# Patient Record
Sex: FEMALE | Race: BLACK OR AFRICAN AMERICAN | NOT HISPANIC OR LATINO | Employment: OTHER | URBAN - METROPOLITAN AREA
[De-identification: names, ages, dates, MRNs, and addresses within clinical notes are randomized per-mention and may not be internally consistent; named-entity substitution may affect disease eponyms.]

---

## 2019-12-09 PROBLEM — D64.9 ANEMIA, NORMOCYTIC NORMOCHROMIC: Status: ACTIVE | Noted: 2019-12-09

## 2019-12-09 PROBLEM — I87.2 STASIS DERMATITIS OF BOTH LEGS: Status: ACTIVE | Noted: 2017-04-08

## 2019-12-09 PROBLEM — E66.3 OVERWEIGHT WITH BODY MASS INDEX (BMI) 25.0-29.9: Status: ACTIVE | Noted: 2019-12-09

## 2019-12-09 PROBLEM — E78.5 DYSLIPIDEMIA: Status: ACTIVE | Noted: 2019-12-09

## 2020-02-14 PROBLEM — R42 DIZZINESS: Status: ACTIVE | Noted: 2020-02-14

## 2020-02-18 PROBLEM — L60.2 NAIL OVERGROWTH: Status: ACTIVE | Noted: 2020-02-18

## 2020-02-18 PROBLEM — L60.0 IGTN (INGROWING TOE NAIL): Status: ACTIVE | Noted: 2020-02-18

## 2020-04-08 ENCOUNTER — EXTERNAL HOME HEALTH (OUTPATIENT)
Dept: HOME HEALTH SERVICES | Facility: HOSPITAL | Age: 80
End: 2020-04-08

## 2020-04-13 ENCOUNTER — EXTERNAL HOME HEALTH (OUTPATIENT)
Dept: HOME HEALTH SERVICES | Facility: HOSPITAL | Age: 80
End: 2020-04-13

## 2020-06-27 PROBLEM — N17.9 AKI (ACUTE KIDNEY INJURY): Status: ACTIVE | Noted: 2020-06-27

## 2020-06-30 PROBLEM — I63.9 CVA (CEREBRAL VASCULAR ACCIDENT): Status: ACTIVE | Noted: 2020-06-30

## 2021-05-19 PROBLEM — M1A.0720 IDIOPATHIC CHRONIC GOUT OF LEFT FOOT WITHOUT TOPHUS: Status: ACTIVE | Noted: 2021-05-19

## 2021-11-01 PROBLEM — M15.9 PRIMARY OSTEOARTHRITIS INVOLVING MULTIPLE JOINTS: Status: ACTIVE | Noted: 2021-11-01

## 2021-11-01 PROBLEM — M15.0 PRIMARY OSTEOARTHRITIS INVOLVING MULTIPLE JOINTS: Status: ACTIVE | Noted: 2021-11-01

## 2022-01-26 PROBLEM — R00.2 PALPITATIONS: Status: ACTIVE | Noted: 2022-01-26

## 2022-04-26 ENCOUNTER — EXTERNAL HOME HEALTH (OUTPATIENT)
Dept: HOME HEALTH SERVICES | Facility: HOSPITAL | Age: 82
End: 2022-04-26
Payer: MEDICARE

## 2022-05-23 PROBLEM — E11.51 TYPE 2 DIABETES MELLITUS WITH DIABETIC PERIPHERAL ANGIOPATHY WITHOUT GANGRENE: Status: ACTIVE | Noted: 2017-07-14

## 2022-05-23 PROBLEM — Z95.818 PRESENCE OF OTHER CARDIAC IMPLANTS AND GRAFTS: Status: ACTIVE | Noted: 2021-09-27

## 2022-08-27 DIAGNOSIS — U07.1 COVID-19 VIRUS DETECTED: ICD-10-CM

## 2022-09-05 ENCOUNTER — NURSE TRIAGE (OUTPATIENT)
Dept: ADMINISTRATIVE | Facility: CLINIC | Age: 82
End: 2022-09-05
Payer: MEDICARE

## 2022-09-05 NOTE — TELEPHONE ENCOUNTER
Duplicate  Reason for Disposition   Caller has already spoken with another triager and has no further questions.    Protocols used: No Contact or Duplicate Contact Call-A-AH

## 2022-09-05 NOTE — TELEPHONE ENCOUNTER
Pt called for c/o weakness, cough, and voice keeps cracking pt was dx with covid 10 day lyle and she said that she isnt getting better she has the lingering symtpoms. Pt denies SOB except when she is coughing continuous but said its more the weakness that she is experiencing. Pt has appt tomorrow to see MD and has a ride pt was triaged and ED advised per protocol for care advice and she said that she didn't have a ride. Pt then told to call 911 and she is refusing. Pt said that she will call back if she gets any worse             Reason for Disposition   Patient sounds very sick or weak to the triager    Additional Information   Negative: SEVERE difficulty breathing (e.g., struggling for each breath, speaks in single words)   Negative: [1] SEVERE weakness (e.g., can't stand or can barely walk) AND [2] new-onset or WORSE   Negative: Difficult to awaken or acting confused (e.g., disoriented, slurred speech)   Negative: Bluish (or gray) lips or face now   Negative: Sounds like a life-threatening emergency to the triager   Negative: [1] MODERATE difficulty breathing (e.g., speaks in phrases, SOB even at rest, pulse 100-120) AND [2] new-onset or WORSE   Negative: [1] MODERATE difficulty breathing AND [2] oxygen level (e.g., pulse oximetry) 91 to 94 percent   Negative: Oxygen level (e.g., pulse oximetry) 90 percent or lower   Negative: [1] Drinking very little AND [2] dehydration suspected (e.g., no urine > 12 hours, very dry mouth, very lightheaded)   Negative: MODERATE difficulty breathing (e.g., speaks in phrases, SOB even at rest, pulse 100-120)    Protocols used: Coronavirus (COVID-19) Persisting Symptoms Follow-up Call-A-

## 2022-09-06 NOTE — TELEPHONE ENCOUNTER
Phone call to patient.  We discussed her persistent COVID symptoms (mostly cough and hoarseness, both of which are getting better).  I see no reason for her to be seen urgently.

## 2022-11-21 PROBLEM — R68.89 ABNORMAL ANKLE BRACHIAL INDEX (ABI): Status: ACTIVE | Noted: 2022-11-21

## 2023-06-07 PROBLEM — R06.02 SHORTNESS OF BREATH ON EXERTION: Status: ACTIVE | Noted: 2023-06-07

## 2023-06-14 PROBLEM — M21.40 PES PLANUS: Status: ACTIVE | Noted: 2023-04-12

## 2023-06-14 PROBLEM — M77.30 CALCANEAL SPUR: Status: ACTIVE | Noted: 2023-04-12

## 2024-02-07 PROBLEM — M79.10 MYALGIA: Status: ACTIVE | Noted: 2024-02-07

## 2024-02-07 PROBLEM — M79.89 LEG SWELLING: Status: ACTIVE | Noted: 2024-02-07

## 2024-02-07 PROBLEM — I38 VALVULAR HEART DISEASE: Status: ACTIVE | Noted: 2024-02-07

## 2024-02-08 PROBLEM — R26.9 GAIT ABNORMALITY: Status: ACTIVE | Noted: 2024-02-08

## 2024-02-08 PROBLEM — E11.9 TYPE 2 DIABETES MELLITUS WITHOUT COMPLICATION, WITHOUT LONG-TERM CURRENT USE OF INSULIN: Status: ACTIVE | Noted: 2024-02-08

## 2024-02-08 PROBLEM — I69.30 HISTORY OF STROKE WITH RESIDUAL EFFECTS: Status: ACTIVE | Noted: 2020-06-30

## 2024-02-12 PROBLEM — N30.00 ACUTE CYSTITIS WITH POSITIVE CULTURE: Status: ACTIVE | Noted: 2024-02-12

## 2024-02-13 PROBLEM — M79.89 LEG SWELLING: Status: RESOLVED | Noted: 2024-02-07 | Resolved: 2024-02-13

## 2024-02-28 PROBLEM — Z91.81 AT RISK FOR FALLS: Status: ACTIVE | Noted: 2023-08-18

## 2024-02-28 PROBLEM — N17.9 AKI (ACUTE KIDNEY INJURY): Status: RESOLVED | Noted: 2020-06-27 | Resolved: 2024-02-28

## 2024-05-02 ENCOUNTER — PATIENT OUTREACH (OUTPATIENT)
Dept: ADMINISTRATIVE | Facility: OTHER | Age: 84
End: 2024-05-02

## 2024-05-02 NOTE — PROGRESS NOTES
CHW - Outreach Attempt    Community Health Worker left a voicemail message for 1st attempt to contact patient regarding: community resources    Community Health Worker to attempt to contact patient on: 5/2/24

## 2024-05-06 NOTE — PROGRESS NOTES
CHW - Outreach Attempt    Community Health Worker left a voicemail message for 2nd attempt to contact patient regarding:  community resources      Community Health Worker to attempt to contact patient on: 5/6/24      Claudia Asif  Office: [phone]  Community Health Worker

## 2024-05-07 NOTE — PROGRESS NOTES
CHW - Initial Contact    This Community Health Worker updated the Social Determinant of Health questionnaire with patient via telephone today.      Pt identified barriers of most importance are: transportation: pt stated she used [employer] but they only provides rides in the AM (8 am- 12 pm)    Pt also stated she doesn't have [employer] transportation    The only other resource is [employer] application (since pt uses a walker) pt stated she will ask her niece to fill out the application      Support and Services: CHW mailed the [employer] transit application      Follow up required: yes    Follow-up Outreach - Due: 5/14/2024

## 2024-05-13 PROBLEM — N30.00 ACUTE CYSTITIS WITH POSITIVE CULTURE: Status: RESOLVED | Noted: 2024-02-12 | Resolved: 2024-05-13

## 2024-05-15 ENCOUNTER — PATIENT OUTREACH (OUTPATIENT)
Dept: ADMINISTRATIVE | Facility: OTHER | Age: 84
End: 2024-05-15

## 2024-05-15 NOTE — PROGRESS NOTES
CHW - Outreach Attempt    Community Health Worker left a voicemail message for 1st attempt to contact patient regarding: if pt received the [employer] application    Community Health Worker to attempt to contact patient on: 5/15/24

## 2024-05-16 ENCOUNTER — PATIENT OUTREACH (OUTPATIENT)
Dept: ADMINISTRATIVE | Facility: OTHER | Age: 84
End: 2024-05-16

## 2024-05-16 NOTE — PROGRESS NOTES
CHW - Follow Up    This Community Health Worker completed a follow up visit with patient via telephone today.    Pt/Caregiver reported: pt confirmed she received the [employer] application chw mailed    Community Health Worker provided: CHW sent in basket to pcp's staff about Rx request      Follow up required: yes      Follow-up Outreach - Due: 5/20/2024

## 2024-05-16 NOTE — PROGRESS NOTES
CHW - Outreach Attempt    Community Health Worker left a voicemail message for 1st attempt to contact patient regarding: [employer] application    Community Health Worker to attempt to contact patient on: 5/16/24

## 2024-05-21 ENCOUNTER — PATIENT OUTREACH (OUTPATIENT)
Dept: ADMINISTRATIVE | Facility: OTHER | Age: 84
End: 2024-05-21

## 2024-05-28 ENCOUNTER — PATIENT OUTREACH (OUTPATIENT)
Dept: ADMINISTRATIVE | Facility: OTHER | Age: 84
End: 2024-05-28

## 2024-05-28 NOTE — PROGRESS NOTES
CHW - Outreach Attempt    Community Health Worker left a voicemail message for 1st attempt to contact patient regarding: community resources    Community Health Worker to attempt to contact patient on: 5/28/24

## 2024-06-04 ENCOUNTER — PATIENT OUTREACH (OUTPATIENT)
Dept: ADMINISTRATIVE | Facility: OTHER | Age: 84
End: 2024-06-04

## 2024-06-04 NOTE — PROGRESS NOTES
CHW - Outreach Attempt    Community Health Worker left a voicemail message for 2nd attempt to contact patient regarding: community resources    Community Health Worker to attempt to contact patient on: 6/4/24

## 2024-06-06 ENCOUNTER — PATIENT OUTREACH (OUTPATIENT)
Dept: ADMINISTRATIVE | Facility: OTHER | Age: 84
End: 2024-06-06

## 2024-08-13 PROBLEM — Z79.4 TYPE 2 DIABETES MELLITUS WITH DIABETIC PERIPHERAL ANGIOPATHY WITHOUT GANGRENE, WITH LONG-TERM CURRENT USE OF INSULIN: Status: ACTIVE | Noted: 2017-07-14

## 2024-08-13 PROBLEM — Z79.4 TYPE 2 DIABETES MELLITUS WITHOUT COMPLICATION, WITH LONG-TERM CURRENT USE OF INSULIN: Status: ACTIVE | Noted: 2024-02-08

## 2024-08-23 ENCOUNTER — PATIENT OUTREACH (OUTPATIENT)
Dept: ADMINISTRATIVE | Facility: OTHER | Age: 84
End: 2024-08-23

## 2024-08-23 NOTE — PROGRESS NOTES
CHW - Outreach Attempt    Community Health Worker left a voicemail message for 1st attempt to contact patient regarding: community resources    Community Health Worker to attempt to contact patient on: 8/23/24

## 2024-08-27 NOTE — PROGRESS NOTES
CHW - Outreach Attempt    Community Health Worker left a voicemail message for 2nd attempt to contact patient regarding: community resources    Community Health Worker to attempt to contact patient on: 8/27/24

## 2024-09-17 ENCOUNTER — PATIENT OUTREACH (OUTPATIENT)
Dept: ADMINISTRATIVE | Facility: OTHER | Age: 84
End: 2024-09-17

## 2024-10-22 PROBLEM — I48.91 ATRIAL FIBRILLATION, UNSPECIFIED TYPE: Status: ACTIVE | Noted: 2024-10-22

## 2024-10-29 ENCOUNTER — PATIENT OUTREACH (OUTPATIENT)
Dept: ADMINISTRATIVE | Facility: OTHER | Age: 84
End: 2024-10-29

## 2024-11-06 PROBLEM — S91.302A OPEN WOUND OF LEFT FOOT: Status: ACTIVE | Noted: 2024-11-01

## 2025-03-05 PROBLEM — L03.116 CELLULITIS OF LEFT LOWER LEG: Status: ACTIVE | Noted: 2025-03-05

## 2025-03-06 PROBLEM — I83.813 VARICOSE VEINS OF BOTH LOWER EXTREMITIES WITH PAIN: Status: ACTIVE | Noted: 2025-03-06

## 2025-03-06 PROBLEM — Z79.4 TYPE 2 DIABETES MELLITUS WITH DIABETIC PERIPHERAL ANGIOPATHY WITHOUT GANGRENE, WITH LONG-TERM CURRENT USE OF INSULIN: Status: RESOLVED | Noted: 2017-07-14 | Resolved: 2025-03-06

## 2025-03-06 PROBLEM — Z79.4 TYPE 2 DIABETES MELLITUS WITH DIABETIC PERIPHERAL ANGIOPATHY WITHOUT GANGRENE, WITH LONG-TERM CURRENT USE OF INSULIN: Status: ACTIVE | Noted: 2025-03-06

## 2025-03-06 PROBLEM — E11.51 TYPE 2 DIABETES MELLITUS WITH DIABETIC PERIPHERAL ANGIOPATHY WITHOUT GANGRENE, WITH LONG-TERM CURRENT USE OF INSULIN: Status: ACTIVE | Noted: 2025-03-06

## 2025-03-06 PROBLEM — E11.51 TYPE 2 DIABETES MELLITUS WITH DIABETIC PERIPHERAL ANGIOPATHY WITHOUT GANGRENE, WITH LONG-TERM CURRENT USE OF INSULIN: Status: RESOLVED | Noted: 2017-07-14 | Resolved: 2025-03-06

## 2025-03-18 ENCOUNTER — PATIENT OUTREACH (OUTPATIENT)
Dept: ADMINISTRATIVE | Facility: OTHER | Age: 85
End: 2025-03-18

## 2025-03-18 PROBLEM — E11.9 TYPE 2 DIABETES MELLITUS WITHOUT COMPLICATION, WITH LONG-TERM CURRENT USE OF INSULIN: Status: RESOLVED | Noted: 2024-02-08 | Resolved: 2025-03-18

## 2025-03-18 PROBLEM — Z79.4 TYPE 2 DIABETES MELLITUS WITHOUT COMPLICATION, WITH LONG-TERM CURRENT USE OF INSULIN: Status: RESOLVED | Noted: 2024-02-08 | Resolved: 2025-03-18

## 2025-03-18 PROBLEM — L97.509 ULCER OF FOOT, UNSPECIFIED LATERALITY, UNSPECIFIED ULCER STAGE: Status: ACTIVE | Noted: 2025-03-18

## 2025-03-18 NOTE — PROGRESS NOTES
CHW - Outreach Attempt    Community Health Worker left a voicemail message for 1st attempt to contact patient regarding: sdoh  Community Health Worker to attempt to contact patient on:

## 2025-03-26 NOTE — PROGRESS NOTES
This Community Health Worker (CHW) completed Social Determinant of Health (SDOH)  Questionnaire with patient/caregiver via telephone today.      Patient denied any SDOH needs at this time. Ms. Em stated that she lives alone and she receives a senior food box from [employer]. She also receives transportation where she pays 1.50 each way to get doctors appointment. She usually pays 10 dollars at a time to cover her rides then pay again when her rides run out. Her niece helps her when needed to ride information or getting to the grocery store, She would like to find additional transportation to the grocery and has agreed to ask [employer] for their transportation to the grocery store for seniors. I have informed her of [employer] number and my contact for any future needs.